# Patient Record
Sex: MALE | Employment: OTHER | URBAN - METROPOLITAN AREA
[De-identification: names, ages, dates, MRNs, and addresses within clinical notes are randomized per-mention and may not be internally consistent; named-entity substitution may affect disease eponyms.]

---

## 2023-11-10 RX ORDER — FENOFIBRATE 145 MG/1
145 TABLET, FILM COATED ORAL
COMMUNITY
Start: 2023-10-17

## 2023-11-10 RX ORDER — LATANOPROST 50 UG/ML
1 SOLUTION/ DROPS OPHTHALMIC NIGHTLY
COMMUNITY
Start: 2016-05-23

## 2023-11-14 ENCOUNTER — ANCILLARY PROCEDURE (OUTPATIENT)
Dept: RADIOLOGY | Facility: CLINIC | Age: 63
End: 2023-11-14
Payer: MEDICARE

## 2023-11-14 ENCOUNTER — OFFICE VISIT (OUTPATIENT)
Dept: ORTHOPEDIC SURGERY | Facility: CLINIC | Age: 63
End: 2023-11-14
Payer: MEDICARE

## 2023-11-14 DIAGNOSIS — M25.562 BILATERAL CHRONIC KNEE PAIN: ICD-10-CM

## 2023-11-14 DIAGNOSIS — G89.29 BILATERAL CHRONIC KNEE PAIN: ICD-10-CM

## 2023-11-14 DIAGNOSIS — M25.561 BILATERAL CHRONIC KNEE PAIN: ICD-10-CM

## 2023-11-14 DIAGNOSIS — M17.12 PRIMARY OSTEOARTHRITIS OF LEFT KNEE: Primary | ICD-10-CM

## 2023-11-14 PROCEDURE — 2500000005 HC RX 250 GENERAL PHARMACY W/O HCPCS: Performed by: STUDENT IN AN ORGANIZED HEALTH CARE EDUCATION/TRAINING PROGRAM

## 2023-11-14 PROCEDURE — 2500000004 HC RX 250 GENERAL PHARMACY W/ HCPCS (ALT 636 FOR OP/ED): Performed by: STUDENT IN AN ORGANIZED HEALTH CARE EDUCATION/TRAINING PROGRAM

## 2023-11-14 PROCEDURE — 73562 X-RAY EXAM OF KNEE 3: CPT | Mod: LT

## 2023-11-14 PROCEDURE — 73564 X-RAY EXAM KNEE 4 OR MORE: CPT | Mod: LEFT SIDE | Performed by: RADIOLOGY

## 2023-11-14 PROCEDURE — 20610 DRAIN/INJ JOINT/BURSA W/O US: CPT | Performed by: STUDENT IN AN ORGANIZED HEALTH CARE EDUCATION/TRAINING PROGRAM

## 2023-11-14 PROCEDURE — 73560 X-RAY EXAM OF KNEE 1 OR 2: CPT | Mod: RT

## 2023-11-14 PROCEDURE — 99214 OFFICE O/P EST MOD 30 MIN: CPT | Performed by: STUDENT IN AN ORGANIZED HEALTH CARE EDUCATION/TRAINING PROGRAM

## 2023-11-14 PROCEDURE — 99204 OFFICE O/P NEW MOD 45 MIN: CPT | Performed by: STUDENT IN AN ORGANIZED HEALTH CARE EDUCATION/TRAINING PROGRAM

## 2023-11-14 RX ADMIN — LIDOCAINE HYDROCHLORIDE 4 ML: 10 INJECTION, SOLUTION INFILTRATION; PERINEURAL at 18:21

## 2023-11-14 RX ADMIN — TRIAMCINOLONE ACETONIDE 1 ML: 40 INJECTION, SUSPENSION INTRA-ARTICULAR; INTRAMUSCULAR at 18:21

## 2024-01-06 RX ORDER — LIDOCAINE HYDROCHLORIDE 10 MG/ML
4 INJECTION INFILTRATION; PERINEURAL
Status: COMPLETED | OUTPATIENT
Start: 2023-11-14 | End: 2023-11-14

## 2024-01-06 RX ORDER — TRIAMCINOLONE ACETONIDE 40 MG/ML
1 INJECTION, SUSPENSION INTRA-ARTICULAR; INTRAMUSCULAR
Status: COMPLETED | OUTPATIENT
Start: 2023-11-14 | End: 2023-11-14

## 2024-01-06 NOTE — PROGRESS NOTES
PRIMARY CARE PHYSICIAN: No primary care provider on file.  REFERRING PROVIDER: No referring provider defined for this encounter.       SUBJECTIVE  CHIEF COMPLAINT: Bilateral knee pain    HPI: Dominic Weaver is a pleasant 63 y.o. year-old male who is seen today for evaluation of bilateral (left greater than right) knee pain. The pain has been present for 5 years. The onset of pain was not associated with a traumatic injury.  Patient feels that she has lost range of motion and mostly complains of stiffness in the left knee.  Dominic Weaver states that the pain is located in the anterior aspect of the knee.      FUNCTIONAL STATUS: occasionally limited.  AMBULATORY STATUS: Independent community ambulation without devices  PREVIOUS TREATMENTS: physical therapy and over the counter medications   HISTORY OF SURGERY ON AFFECTED KNEE(S): No   HIP OR GROIN PAIN REPORTED: No   SYMPTOMS INTERFERING WITH SLEEP: Yes   INSTABILITY: Yes, rarely mild  IMPACTING QUALITY OF LIFE: Yes     REVIEW OF SYSTEMS  The patient denies any fever, chills, chest pain, shortness of breath or difficulty breathing.  Patient denies any numbness, tingling, or radicular symptoms.  Adult patient history sheet was filled out by the patient today in clinic and will be scanned into the EMR.  I personally reviewed this form which will be scanned into the EMR.  This includes Past Medical History, Past Surgical History, Medications, Allergies, Social History, Family History and 12 point review of systems.    No past medical history on file.     No Known Allergies     No past surgical history on file.     No family history on file.     Social History     Socioeconomic History    Marital status: Unknown     Spouse name: Not on file    Number of children: Not on file    Years of education: Not on file    Highest education level: Not on file   Occupational History    Not on file   Tobacco Use    Smoking status: Not on file    Smokeless tobacco: Not on file   Substance and  Sexual Activity    Alcohol use: Not on file    Drug use: Not on file    Sexual activity: Not on file   Other Topics Concern    Not on file   Social History Narrative    Not on file     Social Determinants of Health     Financial Resource Strain: Not on file   Food Insecurity: Not on file   Transportation Needs: Not on file   Physical Activity: Not on file   Stress: Not on file   Social Connections: Not on file   Intimate Partner Violence: Not on file   Housing Stability: Not on file        CURRENT MEDICATIONS:   Current Outpatient Medications   Medication Sig Dispense Refill    fenofibrate (Tricor) 145 mg tablet Take 1 tablet (145 mg) by mouth once daily.      latanoprost (Xalatan) 0.005 % ophthalmic solution Administer 1 drop into affected eye(s) once daily at bedtime.       No current facility-administered medications for this visit.        OBJECTIVE    PHYSICAL EXAM  There is no height or weight on file to calculate BMI.    General: Well-appearing male in no acute distress.  Awake, alert and oriented.  Pleasant and cooperative.  Respiratory: Non-labored breathing  Mood: Euthymic   Gait: Mild antalgia  Assistive Device: None     Affected Left Knee  Limb Alignment: Severe varus  ROM:   Stable to varus and valgus stress at full extension and 30 degrees of flexion  Skin: Intact, no abrasions or draining sinuses  Effusion: None  Quad Strength: 5/5  Hamstring Strength: 5/5  Patella Crepitus: None  Patella Grind: Negative  Tenderness: None today  Sensation: Intact to light touch distally  Motor function: Able to fire TA, EHL, G/S  Pulses: Palpable DP pulse    Affected right knee  Limb Alignment: Moderate varus  ROM: 5-120  Stable to varus and valgus stress at full extension and 30 degrees of flexion  Skin: Intact, no abrasions or draining sinuses  Effusion: None  Quad Strength: 5/5  Hamstring Strength: 5/5  Patella Crepitus: None  Patella Grind: Negative  Tenderness: None today  Sensation: Intact to light touch  distally  Motor function: Able to fire TA, EHL, G/S  Pulses: Palpable DP pulse    IMAGING:  AP / lateral/ mid-flexion/sunrise views: Independent review of bilateral  knee x-rays was performed. The findings were reviewed with the patient. There are severe degenerative changes of the bilateral knees with varus limb alignment. There is joint space narrowing, subchondral sclerosis, and osteophyte formation. No evidence of fracture, AVN, dislocation, osteomyelitis.        ASSESSMENT & PLAN    IMPRESSION:  Dominic Weaver is a 63 y.o. male,  when seen today for bilateral knee pain, left greater than right, in the setting of end-stage, tricompartmental osteoarthritis.  I lengthy discussion with the patient about treatment options.  We discussed both surgical and nonsurgical treatment.  The patient has tried only a limited subset of conservative treatment measures.  Accordingly, I recommended that we attempted intra-articular injection today to see his response.  The risks of intra-articular injection were discussed.    Patient ID: Dominic Weaver is a 63 y.o. male.    L Inj/Asp: L knee on 11/14/2023 6:21 PM  Indications: pain and joint swelling  Details: 22 G needle, anterolateral approach  Medications: 4 mL lidocaine 10 mg/mL (1 %); 1 mL triamcinolone acetonide 40 mg/mL    Discussion:  I discussed the conservative treatment options for knee osteoarthritis including but not limited to physical therapy, oral NSAIDS, activity and lifestyle modification, and corticosteroid injections. Pt has elected to undergo a cortisone injection today. I have explained the risk and benefits of an injection including the possibility of joint infection, bleeding, damage to cartilage, allergic reaction. Patient verbalized understanding and gave verbal consent wishes to proceed with a intra-articular cortisone injection for their knee.    Procedure:  After discussing the risk and benefits of the procedure, we proceeded with an intra-articular left knee  injection.    With the patient's informed verbal consent, the left knee was prepped in standard sterile fashion with Chlorhexidine. The skin was then anesthetized with ethyl chloride spray and cleaned again with Chlorhexidine. The knee was then apirated/injected with a prefilled 20-gauge syringe of 40 mg Kenalog + 4 ml Lidocaine using the lateral approach without complications.  The patient tolerated this well and felt immediate initial relief of symptoms. A bandaid was applied and the patient ambulated out of the clinic on ther own accord without difficulty. Patient was instructed to avoid physical activity for 24-48 hours to prevent the knees from swelling and may ice the knees as tolerated. Patient should contact the office if any signs of of infection appear: redness, fever, chills, drainage, swelling or warmth to the knees.  Pt understands that the injections can be repeated no sooner than 3 months.  Procedure, treatment alternatives, risks and benefits explained, specific risks discussed. Consent was given by the patient. Immediately prior to procedure a time out was called to verify the correct patient, procedure, equipment, support staff and site/side marked as required. Patient was prepped and draped in the usual sterile fashion.         I will see patient back in 4 months.  At that juncture, we will discuss either repeating the corticosteroid injection or moving forward with total joint arthroplasty.     *This note was created using voice recognition software and was not corrected for typographical or grammatical errors.*

## 2024-03-19 ENCOUNTER — APPOINTMENT (OUTPATIENT)
Dept: ORTHOPEDIC SURGERY | Facility: CLINIC | Age: 64
End: 2024-03-19
Payer: MEDICARE

## 2024-03-20 ENCOUNTER — OFFICE VISIT (OUTPATIENT)
Dept: ORTHOPEDIC SURGERY | Facility: CLINIC | Age: 64
End: 2024-03-20
Payer: MEDICARE

## 2024-03-20 DIAGNOSIS — M17.0 PRIMARY OSTEOARTHRITIS OF KNEES, BILATERAL: Primary | ICD-10-CM

## 2024-03-20 PROCEDURE — 99214 OFFICE O/P EST MOD 30 MIN: CPT | Performed by: STUDENT IN AN ORGANIZED HEALTH CARE EDUCATION/TRAINING PROGRAM

## 2024-03-20 PROCEDURE — 1160F RVW MEDS BY RX/DR IN RCRD: CPT | Performed by: STUDENT IN AN ORGANIZED HEALTH CARE EDUCATION/TRAINING PROGRAM

## 2024-03-20 PROCEDURE — 1159F MED LIST DOCD IN RCRD: CPT | Performed by: STUDENT IN AN ORGANIZED HEALTH CARE EDUCATION/TRAINING PROGRAM

## 2024-03-20 PROCEDURE — 20610 DRAIN/INJ JOINT/BURSA W/O US: CPT | Performed by: STUDENT IN AN ORGANIZED HEALTH CARE EDUCATION/TRAINING PROGRAM

## 2024-03-20 RX ADMIN — LIDOCAINE HYDROCHLORIDE 4 ML: 10 INJECTION INFILTRATION; PERINEURAL at 14:41

## 2024-03-20 RX ADMIN — TRIAMCINOLONE ACETONIDE 1 ML: 40 INJECTION, SUSPENSION INTRA-ARTICULAR; INTRAMUSCULAR at 14:41

## 2024-03-23 RX ORDER — LIDOCAINE HYDROCHLORIDE 10 MG/ML
4 INJECTION INFILTRATION; PERINEURAL
Status: COMPLETED | OUTPATIENT
Start: 2024-03-20 | End: 2024-03-20

## 2024-03-23 RX ORDER — TRIAMCINOLONE ACETONIDE 40 MG/ML
1 INJECTION, SUSPENSION INTRA-ARTICULAR; INTRAMUSCULAR
Status: COMPLETED | OUTPATIENT
Start: 2024-03-20 | End: 2024-03-20

## 2024-03-23 NOTE — PROGRESS NOTES
PRIMARY CARE PHYSICIAN: Shiraz Lucas MD  REFERRING PROVIDER: No referring provider defined for this encounter.       SUBJECTIVE  CHIEF COMPLAINT: Bilateral knee pain    HPI: Dominic Weaver is a pleasant 63 y.o. year-old male who is seen today for follow up evaluation of bilateral knee pain. I last saw the patient in November at which point I administered a left knee intra-articular injection. The injection provided him with good relief for 2 months. His pain has since returned. No new trauma or injury.     FUNCTIONAL STATUS: occasionally limited.  AMBULATORY STATUS: Independent community ambulation without devices  PREVIOUS TREATMENTS: physical therapy and over the counter medications   HISTORY OF SURGERY ON AFFECTED KNEE(S): No   HIP OR GROIN PAIN REPORTED: No   SYMPTOMS INTERFERING WITH SLEEP: Yes   INSTABILITY: Yes, rarely mild  IMPACTING QUALITY OF LIFE: Yes     REVIEW OF SYSTEMS  There has been no interval change in this patient's past medical, surgical, medications, allergies, family history or social history since the most recent visit to a provider within our department.  14 point review of systems was performed, reviewed, and negative except for pertinent positives documented in the history of present illness.    No past medical history on file.     No Known Allergies     No past surgical history on file.     No family history on file.     Social History     Socioeconomic History    Marital status: Unknown     Spouse name: Not on file    Number of children: Not on file    Years of education: Not on file    Highest education level: Not on file   Occupational History    Not on file   Tobacco Use    Smoking status: Not on file    Smokeless tobacco: Not on file   Substance and Sexual Activity    Alcohol use: Not on file    Drug use: Not on file    Sexual activity: Not on file   Other Topics Concern    Not on file   Social History Narrative    Not on file     Social Determinants of Health     Financial Resource  Strain: Not on file   Food Insecurity: Not on file   Transportation Needs: Not on file   Physical Activity: Not on file   Stress: Not on file   Social Connections: Not on file   Intimate Partner Violence: Not on file   Housing Stability: Not on file        CURRENT MEDICATIONS:   Current Outpatient Medications   Medication Sig Dispense Refill    fenofibrate (Tricor) 145 mg tablet Take 1 tablet (145 mg) by mouth once daily.      latanoprost (Xalatan) 0.005 % ophthalmic solution Administer 1 drop into affected eye(s) once daily at bedtime.       No current facility-administered medications for this visit.        OBJECTIVE    PHYSICAL EXAM  There is no height or weight on file to calculate BMI.    General: Well-appearing male in no acute distress.  Awake, alert and oriented.  Pleasant and cooperative.  Respiratory: Non-labored breathing  Mood: Euthymic   Gait: Mild antalgia  Assistive Device: None     Affected Left Knee  Limb Alignment: Severe varus  ROM:   Stable to varus and valgus stress at full extension and 30 degrees of flexion  Skin: Intact, no abrasions or draining sinuses  Effusion: None  Quad Strength: 5/5  Hamstring Strength: 5/5  Patella Crepitus: None  Patella Grind: Negative  Tenderness: None today  Sensation: Intact to light touch distally  Motor function: Able to fire TA, EHL, G/S  Pulses: Palpable DP pulse    Affected right knee  Limb Alignment: Moderate varus  ROM: 5-120  Stable to varus and valgus stress at full extension and 30 degrees of flexion  Skin: Intact, no abrasions or draining sinuses  Effusion: None  Quad Strength: 5/5  Hamstring Strength: 5/5  Patella Crepitus: None  Patella Grind: Negative  Tenderness: None today  Sensation: Intact to light touch distally  Motor function: Able to fire TA, EHL, G/S  Pulses: Palpable DP pulse    IMAGING:  AP / lateral/ mid-flexion/sunrise views: Independent review of bilateral  knee x-rays was performed. The findings were reviewed with the patient.  There are severe degenerative changes of the bilateral knees with varus limb alignment. There is joint space narrowing, subchondral sclerosis, and osteophyte formation. No evidence of fracture, AVN, dislocation, osteomyelitis.        ASSESSMENT & PLAN    IMPRESSION:  Dominic Weaver is a 63 y.o. male seen today for follow-up evaluation of bilateral knee pain in the setting of end-stage osteoarthritis.  We discussed treatment options.  Patient would like to continue with conservative treatment measures.    Patient ID: Dominic Weaver is a 63 y.o. male.    L Inj/Asp: bilateral knee on 3/20/2024 2:41 PM  Indications: pain and joint swelling  Details: 22 G needle, anterolateral approach  Medications (Right): 1 mL triamcinolone acetonide 40 mg/mL; 4 mL lidocaine 10 mg/mL (1 %)  Medications (Left): 1 mL triamcinolone acetonide 40 mg/mL; 4 mL lidocaine 10 mg/mL (1 %)    Discussion:  I discussed the conservative treatment options for knee osteoarthritis including but not limited to physical therapy, oral NSAIDS, activity and lifestyle modification, and corticosteroid injections. Pt has elected to undergo a cortisone injection today. I have explained the risk and benefits of an injection including the possibility of joint infection, bleeding, damage to cartilage, allergic reaction. Patient verbalized understanding and gave verbal consent wishes to proceed with a intra-articular cortisone injection for their knee.    Procedure:  After discussing the risk and benefits of the procedure, we proceeded with an intra-articular bilateral knee injection.    With the patient's informed verbal consent, the bilateral knees were prepped in standard sterile fashion with Chlorhexidine. The skin was then anesthetized with ethyl chloride spray and cleaned again with Chlorhexidine. The bilateral knees were then apirated/injected with a prefilled 20-gauge syringe of 40 mg Kenalog + 4 ml Lidocaine using the lateral approach without complications.  The  patient tolerated this well and felt immediate initial relief of symptoms. A bandaid was applied and the patient ambulated out of the clinic on ther own accord without difficulty. Patient was instructed to avoid physical activity for 24-48 hours to prevent the knees from swelling and may ice the knees as tolerated. Patient should contact the office if any signs of of infection appear: redness, fever, chills, drainage, swelling or warmth to the knees.  Pt understands that the injections can be repeated no sooner than 3 months.      Procedure, treatment alternatives, risks and benefits explained, specific risks discussed. Consent was given by the patient. Immediately prior to procedure a time out was called to verify the correct patient, procedure, equipment, support staff and site/side marked as required. Patient was prepped and draped in the usual sterile fashion.             Follow-up in 4 months for repeat injections or discussion of alternative treatments including total knee arthroplasty.    *This note was created using voice recognition software and was not corrected for typographical or grammatical errors.*